# Patient Record
Sex: FEMALE | Race: WHITE | NOT HISPANIC OR LATINO | Employment: UNEMPLOYED | URBAN - NONMETROPOLITAN AREA
[De-identification: names, ages, dates, MRNs, and addresses within clinical notes are randomized per-mention and may not be internally consistent; named-entity substitution may affect disease eponyms.]

---

## 2023-09-09 ENCOUNTER — HOSPITAL ENCOUNTER (EMERGENCY)
Facility: HOSPITAL | Age: 33
Discharge: HOME | End: 2023-09-09
Attending: FAMILY MEDICINE
Payer: MEDICAID

## 2023-09-09 VITALS
WEIGHT: 185 LBS | HEART RATE: 84 BPM | SYSTOLIC BLOOD PRESSURE: 115 MMHG | OXYGEN SATURATION: 97 % | DIASTOLIC BLOOD PRESSURE: 77 MMHG | BODY MASS INDEX: 32.78 KG/M2 | HEIGHT: 63 IN | RESPIRATION RATE: 20 BRPM | TEMPERATURE: 98.7 F

## 2023-09-09 DIAGNOSIS — H61.22 IMPACTED CERUMEN OF LEFT EAR: Primary | ICD-10-CM

## 2023-09-09 PROCEDURE — 99282 EMERGENCY DEPT VISIT SF MDM: CPT

## 2023-09-09 NOTE — DISCHARGE INSTRUCTIONS
Call one of the offices below to establish a primary care provider.  If you are unable to get an appointment and feel it is an emergency and need to be seen immediately please return to the Emergency Department.    Call one of the office below to set up a primary care provider.    Dr. Gregory Rahman                                                                                                       [address] [facility] [address]  [phone]    Dr. Cisneros, Dr. MINE Christina, Dr. LIZ Christina ([facility])  [address] [facility] [address]  [phone]    Dr. Edwards, Dr. Nielsen, Dr. Falcon ([facility])  [address] [facility] [address]  [phone]    Dr. Clark  [address] [facility] [address]  [phone]    Dr. Man, Dr. Moncada, Dr. Dominguez, Dr. Rodriguez ([facility])  [address] [place] [address] [address]  [facility] [address]  [phone]    Dr. Sindhu Camarillo  [address] [facility] [place] [address]  [phone]    Dr. Heather Gonzalez  [address]   [address]  [facility] [address]  [phone]    Dr. Rahman  [address] [facility] [address]  [phone]    Dr. Bradley, Dr. Luz  [address] [facility] [place] [address]  [phone]    Dr. Muro  [address]                                                              [address]  [facility] [address]  [phone]    Dr. Camacho  [address] [facility] [address]  [phone]    Dr. Lary Pérez  [address]  [address]  [place] [address]  [phone]    Dr. Louie and [facility]   [address] [facility]  [address]  [place] [address]  [phone]

## 2023-09-09 NOTE — ED PROVIDER NOTES
Subjective   History of Present Illness  Patient is a 32-year-old female presents emerged today with complaint of left earache.  Patient reports that this feels like she cannot hear.  Patient reports this is started about 2 to 3 days ago.  Patient reports he is also noted small amount of blood from the area.  Patient denies any alleviating or worsening factors.  Patient reports has been using antibiotic eardrops with no improvement.    Earache    Review of Systems   Constitutional: Negative.    HENT:  Positive for ear pain.    Eyes: Negative.    Respiratory: Negative.     Cardiovascular: Negative.    Gastrointestinal: Negative.    Endocrine: Negative.    Genitourinary: Negative.    Musculoskeletal: Negative.    Skin: Negative.    Allergic/Immunologic: Negative.    Neurological: Negative.    Hematological: Negative.    Psychiatric/Behavioral: Negative.       History reviewed. No pertinent past medical history.    No Known Allergies    History reviewed. No pertinent surgical history.    History reviewed. No pertinent family history.    Social History     Socioeconomic History    Marital status:            Objective   Physical Exam  Vitals and nursing note reviewed.   Constitutional:       Appearance: She is well-developed.   HENT:      Head: Normocephalic.      Right Ear: External ear normal.      Left Ear: External ear normal.   Eyes:      Conjunctiva/sclera: Conjunctivae normal.      Pupils: Pupils are equal, round, and reactive to light.   Cardiovascular:      Rate and Rhythm: Normal rate and regular rhythm.      Heart sounds: Normal heart sounds.   Pulmonary:      Effort: Pulmonary effort is normal.      Breath sounds: Normal breath sounds.   Abdominal:      General: Bowel sounds are normal.      Palpations: Abdomen is soft.   Musculoskeletal:         General: Normal range of motion.      Cervical back: Normal range of motion and neck supple.   Skin:     General: Skin is warm and dry.      Capillary Refill:  Capillary refill takes less than 2 seconds.   Neurological:      Mental Status: She is alert and oriented to person, place, and time.   Psychiatric:         Behavior: Behavior normal.         Thought Content: Thought content normal.       Procedures           ED Course                                           Medical Decision Making  Problems Addressed:  Impacted cerumen of left ear: acute illness or injury    Risk  OTC drugs.        Final diagnoses:   Impacted cerumen of left ear       ED Disposition  ED Disposition       ED Disposition   Discharge    Condition   Stable    Comment   --               No follow-up provider specified.       Medication List        New Prescriptions      carbamide peroxide 6.5 % otic solution  Commonly known as: DEBROX  Administer 5 drops to the right ear 2 (Two) Times a Day.               Where to Get Your Medications        You can get these medications from any pharmacy    Bring a paper prescription for each of these medications  carbamide peroxide 6.5 % otic solution            James Aguilar, APRN  09/10/23 1949